# Patient Record
Sex: MALE
[De-identification: names, ages, dates, MRNs, and addresses within clinical notes are randomized per-mention and may not be internally consistent; named-entity substitution may affect disease eponyms.]

---

## 2024-09-04 PROBLEM — Z00.00 ENCOUNTER FOR PREVENTIVE HEALTH EXAMINATION: Status: ACTIVE | Noted: 2024-09-04

## 2024-09-16 ENCOUNTER — APPOINTMENT (OUTPATIENT)
Dept: SURGERY | Facility: CLINIC | Age: 44
End: 2024-09-16
Payer: COMMERCIAL

## 2024-09-16 VITALS
DIASTOLIC BLOOD PRESSURE: 78 MMHG | HEART RATE: 87 BPM | RESPIRATION RATE: 10 BRPM | SYSTOLIC BLOOD PRESSURE: 123 MMHG | OXYGEN SATURATION: 99 %

## 2024-09-16 PROCEDURE — 99203 OFFICE O/P NEW LOW 30 MIN: CPT

## 2024-09-16 NOTE — HISTORY OF PRESENT ILLNESS
[de-identified] : 44yoM w/o any significant PMHx, no smoking hx, s/p OPAL w/Dr. Olmstead at [facility] in 01/2021 for symptomatic hernia, returns today for recurrent discomfort in the L groin w/associated mass, especially w/valsalva/exertion.  Specifically, pt reports 2/10 discomfort when exercising, which he has been limiting over the past few weeks.  Pt denies constipation, N/V, reports normal eating patterns and BMs/urinary patterns.

## 2024-09-16 NOTE — ASSESSMENT
[FreeTextEntry1] : 44yoM w/recurrent symptoms of L groin pain at the site of a previous L inguinal herniorrhaphy in 01/2021 w/Dr. Olmstead.  On exam, no defects in the groins noted, no tenderness or reducible mass noted.  Recommend pt modify weights used during work-outs and use NSAIDs PRN discomfort as his pain may be due to muscle strain and not to a recurrent hernia.  He will RTO PRN.

## 2024-09-16 NOTE — REASON FOR VISIT
[Initial Consultation] : an initial consultation for [FreeTextEntry2] : s/p open [facility] in 2021 by Ishan at [facility], recurrent pain

## 2024-09-16 NOTE — ADDENDUM
[FreeTextEntry1] : This note was written by Raffaele Lyle, acting as a scribe for Dr. DIANE Olmstead.  I, Dr. DIANE Olmstead, have read and attest that all the information, medical decision-making, and discharge instructions within are true and accurate.   I, Dr. DIANE Olmstead, personally performed the evaluation and management (E/M) services for this new patient.  That E/M includes conducting the initial examination, assessing all conditions, and establishing the plan of care.  Today, my ACP, Raffaele Lyle, was here to observe my evaluation and management services for this patient to be followed going forward.

## 2024-09-16 NOTE — PHYSICAL EXAM
[Normal] : supple, no neck mass and thyroid not enlarged [Normal Male] : prostate smooth, symmetric with no modularity or induration [Normal] : no peripheral adenopathy appreciated [Normal Neck Lymph Nodes] : normal neck lymph nodes  [Normal Supraclavicular Lymph Nodes] : normal supraclavicular lymph nodes [Normal Groin Lymph Nodes] : normal groin lymph nodes [Normal Axillary Lymph Nodes] : normal axillary lymph nodes [de-identified] : Normal respiratory rate [de-identified] : No defect noted in either groin